# Patient Record
Sex: FEMALE | Race: WHITE | NOT HISPANIC OR LATINO | Employment: OTHER | URBAN - METROPOLITAN AREA
[De-identification: names, ages, dates, MRNs, and addresses within clinical notes are randomized per-mention and may not be internally consistent; named-entity substitution may affect disease eponyms.]

---

## 2018-06-28 ENCOUNTER — TELEPHONE (OUTPATIENT)
Dept: SURGERY | Facility: CLINIC | Age: 68
End: 2018-06-28

## 2018-06-28 NOTE — TELEPHONE ENCOUNTER
----- Message from Loren Valentin sent at 6/28/2018  2:21 PM CDT -----  Contact: Loren - [facility] Baldwin and staff,     Dr Jude Acevedo  Is referring this pt to Dr Johnson as he has seen her in the past.     Dx Pancreatic lesion/mass.     Can you see this pt on his behalf?     The refrl, records are in media     Thank you,  Loren Valentin  [facility]   [id number]

## 2018-07-09 ENCOUNTER — INITIAL CONSULT (OUTPATIENT)
Dept: SURGERY | Facility: CLINIC | Age: 68
End: 2018-07-09
Payer: MEDICARE

## 2018-07-09 VITALS
BODY MASS INDEX: 31.66 KG/M2 | HEIGHT: 64 IN | HEART RATE: 65 BPM | SYSTOLIC BLOOD PRESSURE: 142 MMHG | DIASTOLIC BLOOD PRESSURE: 86 MMHG | WEIGHT: 185.44 LBS | TEMPERATURE: 98 F

## 2018-07-09 DIAGNOSIS — K86.89 PANCREATIC MASS: ICD-10-CM

## 2018-07-09 PROCEDURE — 99213 OFFICE O/P EST LOW 20 MIN: CPT | Mod: PBBFAC | Performed by: NURSE PRACTITIONER

## 2018-07-09 PROCEDURE — 99999 PR PBB SHADOW E&M-EST. PATIENT-LVL III: CPT | Mod: PBBFAC,,, | Performed by: NURSE PRACTITIONER

## 2018-07-09 PROCEDURE — 99204 OFFICE O/P NEW MOD 45 MIN: CPT | Mod: S$PBB,,, | Performed by: NURSE PRACTITIONER

## 2018-07-09 RX ORDER — MONTELUKAST SODIUM 10 MG/1
10 TABLET ORAL NIGHTLY
Refills: 1 | COMMUNITY
Start: 2018-04-20 | End: 2018-08-08

## 2018-07-09 RX ORDER — ATORVASTATIN CALCIUM 40 MG/1
TABLET, FILM COATED ORAL
COMMUNITY

## 2018-07-09 RX ORDER — AMOXICILLIN 500 MG
CAPSULE ORAL DAILY
COMMUNITY

## 2018-07-09 RX ORDER — METOPROLOL SUCCINATE 50 MG/1
TABLET, EXTENDED RELEASE ORAL
COMMUNITY

## 2018-07-09 RX ORDER — ALBUTEROL SULFATE 90 UG/1
AEROSOL, METERED RESPIRATORY (INHALATION)
COMMUNITY
End: 2018-08-08

## 2018-07-09 NOTE — LETTER
July 10, 2018      Jude Acevedo MD  [address]  [place] [address]           [facility]  [address]  [place] [address]  Phone: [phone]          Patient: Starr Lau   MR Number: [id number]   YOB: 1950   Date of Visit: 7/9/2018       Dear Dr. Jude Acevedo:    Thank you for referring Starr Lau to [facility] for evaluation. Attached you will find relevant portions of my assessment and plan of care.    If you have questions, please do not hesitate to call me. I look forward to following Starr Lau along with you.    Sincerely,    Stacy Pillai, EDMUND    Enclosure      If you would like to receive this communication electronically, please contact [email or web address] or [phone] to request more information on [employer]* Link access.    For providers and/or their staff who would like to refer a patient to [facility], please contact us through our one-stop-shop provider referral line, [facility], at [phone].    If you feel you have received this communication in error or would no longer like to receive these types of communications, please e-mail [email or web address]

## 2018-07-09 NOTE — PROGRESS NOTES
Patient seen with Lorraine Pillai NP; current outside CT scan personally reviewed. The cystic mass has increased to 3 cm in size, and there has been significant dilation of the upstream PD. Dr Pascual's EUS note from 2012 found in [facility]; his impression at that time was that this was probably a serous cystadenoma. I have recommended repeat EUS to be sure we have accurately characterized the lesion. Discussed with Ms Lau and her sisters. We will also plan to present her to Pancreatic Cyst Conference after the EUS.

## 2018-07-10 ENCOUNTER — TELEPHONE (OUTPATIENT)
Dept: GASTROENTEROLOGY | Facility: CLINIC | Age: 68
End: 2018-07-10

## 2018-07-10 DIAGNOSIS — K86.3 CYST AND PSEUDOCYST OF PANCREAS: Primary | ICD-10-CM

## 2018-07-10 DIAGNOSIS — K86.2 CYST AND PSEUDOCYST OF PANCREAS: Primary | ICD-10-CM

## 2018-07-10 PROBLEM — K86.89 PANCREATIC MASS: Status: ACTIVE | Noted: 2018-07-10

## 2018-07-10 NOTE — PROGRESS NOTES
"History & Physical    SUBJECTIVE:     History of Present Illness:  Ms. Lau is a healthily and active 68 year old female previously seen and evaluated by Kayy Johnson and Ankur for a pancreatic head mass.  The testing done at the time, 8/2010, showed findings most consistent with a serous cystadenoma.     EUS from 8/2010:  2. 7 cm multicystic, honeycombed appearance..  PD was 6.5 mm without defect in HOP, 2.2 in body and 1.4mm in tail.  Mass well defined, no LAD, no vessel involvement noted.    Endosonographic appearance suspicious for SCA.     PATH:   Benign with at least focal PAS positivity, suggestive of microcystic adenoma.     She was followed with imaging thru 10/10/2013:  CT 2013: 1.  Stable to slightly enlarged pancreatic head mass which displayed cystic features according to report from prior endoscopic ultrasound.  There is no evidence of metastatic disease or lymphadenopathy.  2.  Stable left lower lobe pulmonary nodule.  3.  Multiple subcentimeter liver hypodensities, unchanged.    She has some updated imaging outside lately and the images and report were personally viewed by Dr. Baldwin.  I do not have access to the 2010, 2011 or 2012 images but I do have access to 2013.  I measure the mass roughly the same on both at ~ 3.2 x 3.0 cm but the PD has increased from 6mm to 10 mm dilatation in the HOP on the recent study.  The original size of the lesion on EUS in 2010 was reported at 2.7, and 2.4 x 2.0 on CT.       The original reason for the imaging was epigastric discomfort which was later attributed to cholecystitis and she did undergo a lap guido by Dr. Johnson on 2/2/11 with findings consistent with chronic cholecystitis.  Her symptoms improved and the cystic lesion, given no significant change over several years, was deemed consistent with a benign serous cystadenoma.  Her serum lipase and amylase at the time were normal.     Currently she has some mild "epigastric pressure" radiating to her mid back " which is not particularly bothersome to her, she is simple aware of it.  Reading past notes from 2010, however, that is pretty much how she described her symptoms at that time. Otherwise she is very active with an excellent FS.  She has no hx of pancreatitis.          Review of patient's allergies indicates:  No Known Allergies    Current Outpatient Prescriptions   Medication Sig Dispense Refill    albuterol (PROAIR HFA) 90 mcg/actuation inhaler ProAir HFA 90 mcg/actuation aerosol inhaler      aspirin (ECOTRIN) 81 MG EC tablet Take 81 mg by mouth once daily.      atorvastatin (LIPITOR) 40 MG tablet atorvastatin 40 mg tablet      calcium carbonate (OS-ABDIEL) 600 mg (1,500 mg) Tab Take 600 mg by mouth 2 (two) times daily with meals.      co-enzyme Q-10 50 mg capsule Take 100 mg by mouth once daily.      fish oil-omega-3 fatty acids 300-1,000 mg capsule Take by mouth once daily.      Lactobacillus rhamnosus GG (CULTURELLE) 10 billion cell capsule Take 1 capsule by mouth once daily.      lisinopril (PRINIVIL,ZESTRIL) 2.5 MG tablet Take 2.5 mg by mouth Daily.      metoprolol succinate (TOPROL-XL) 50 MG 24 hr tablet metoprolol succinate ER 50 mg tablet,extended release 24 hr      montelukast (SINGULAIR) 10 mg tablet Take 10 mg by mouth every evening.  1    multivitamin capsule Take 1 capsule by mouth once daily.       No current facility-administered medications for this visit.        Past Medical History:   Diagnosis Date    Asthma     Hyperlipidemia     Hypertension      Past Surgical History:   Procedure Laterality Date    HYSTERECTOMY      KNEE ARTHROSCOPY  2001     Family History   Problem Relation Age of Onset    Cancer Mother         Br    Cancer Sister         Br     Social History   Substance Use Topics    Smoking status: Never Smoker    Smokeless tobacco: Never Used    Alcohol use 1.2 oz/week     2 Cans of beer per week        Review of Systems:  Review of Systems   Constitutional: Negative.   "  HENT: Negative.    Eyes: Negative.    Respiratory: Negative.    Cardiovascular: Negative.    Gastrointestinal: Negative.         Mild pressure from RUQ to back   Genitourinary: Negative.    Musculoskeletal: Negative.    Skin: Negative.    Neurological: Negative.    Psychiatric/Behavioral: Negative.        OBJECTIVE:     Vital Signs (Most Recent)  Temp: 98 °F (36.7 °C) (07/09/18 1454)  Pulse: 65 (07/09/18 1454)  BP: (!) 142/86 (07/09/18 1454)  5' 4" (1.626 m)  84.1 kg (185 lb 6.5 oz)     Physical Exam:  Physical Exam   Constitutional: She is oriented to person, place, and time. She appears well-developed and well-nourished.   HENT:   Head: Normocephalic and atraumatic.   Eyes: Conjunctivae are normal.   Neck: Normal range of motion. Neck supple.   Cardiovascular: Normal rate, regular rhythm and normal heart sounds.    Pulmonary/Chest: Effort normal and breath sounds normal.   Abdominal: Soft. Bowel sounds are normal.   Musculoskeletal: Normal range of motion. She exhibits no edema.   Neurological: She is alert and oriented to person, place, and time.   Skin: Skin is dry.   Psychiatric: She has a normal mood and affect.         ASSESSMENT/PLAN:     Pancreatic head mass as above.      PLAN:  Given some reported growth and increased PD dilatation over time we will repeat an EUS for additional info and then discuss in pancreatic cyst conference.    Additioanl recommendations to follow.    "

## 2018-07-10 NOTE — TELEPHONE ENCOUNTER
----- Message from Esperanza Rodney MD sent at 7/10/2018 11:29 AM CDT -----  Agree, EUS and then discuss in conference.     ----- Message -----  From: Cecilia Biggs MA  Sent: 7/9/2018   4:07 PM  To: Esperanza Rodney MD    Please advise  ----- Message -----  From: Stacy Pillai NP  Sent: 7/9/2018   4:01 PM  To: Cecilia Biggs MA, Lou Lau, RN    Saji Young,  She needs an EUS for pancreatic cystic lesion in [facility].  Dr. Pascual did one in 2010 and report is in [facility].  It has grown to 3.3 cm from 2.4? and PD is now 11mm, was 6 mm.  Was thought to be a serous cystadenoma and last CT was 5 years ago.  Those images are in [facility] and I will have her updated outside images scanned in.    She is on vacation all next week.  Please put her on for cyst clinic after EUS.    Thanks.  Lorraine

## 2018-07-13 ENCOUNTER — TELEPHONE (OUTPATIENT)
Dept: GASTROENTEROLOGY | Facility: CLINIC | Age: 68
End: 2018-07-13

## 2018-07-13 ENCOUNTER — TELEPHONE (OUTPATIENT)
Dept: ENDOSCOPY | Facility: HOSPITAL | Age: 68
End: 2018-07-13

## 2018-07-13 NOTE — TELEPHONE ENCOUNTER
Spoke with patient. EUS scheduled for 7/26 at 9a. Reviewed prep instructions. Ms Lau verbalized understanding.

## 2018-07-13 NOTE — TELEPHONE ENCOUNTER
----- Message from Marlin Dubose sent at 7/13/2018  9:02 AM CDT -----  Contact: Self- [phone]  Devonte Rodney- pt states she is returning a missed call- please contact pt at [phone]

## 2018-07-26 ENCOUNTER — SURGERY (OUTPATIENT)
Age: 68
End: 2018-07-26

## 2018-07-26 ENCOUNTER — HOSPITAL ENCOUNTER (OUTPATIENT)
Facility: HOSPITAL | Age: 68
Discharge: HOME | End: 2018-07-26
Attending: INTERNAL MEDICINE | Admitting: INTERNAL MEDICINE
Payer: MEDICARE

## 2018-07-26 ENCOUNTER — ANESTHESIA (OUTPATIENT)
Dept: ENDOSCOPY | Facility: HOSPITAL | Age: 68
End: 2018-07-26
Payer: MEDICARE

## 2018-07-26 ENCOUNTER — ANESTHESIA EVENT (OUTPATIENT)
Dept: ENDOSCOPY | Facility: HOSPITAL | Age: 68
End: 2018-07-26
Payer: MEDICARE

## 2018-07-26 VITALS
RESPIRATION RATE: 18 BRPM | WEIGHT: 186 LBS | TEMPERATURE: 98 F | OXYGEN SATURATION: 95 % | HEIGHT: 64 IN | SYSTOLIC BLOOD PRESSURE: 131 MMHG | HEART RATE: 61 BPM | DIASTOLIC BLOOD PRESSURE: 79 MMHG | BODY MASS INDEX: 31.76 KG/M2

## 2018-07-26 DIAGNOSIS — K86.2 PANCREATIC CYST: ICD-10-CM

## 2018-07-26 DIAGNOSIS — K86.89 PANCREATIC MASS: Primary | ICD-10-CM

## 2018-07-26 PROCEDURE — 88305 TISSUE EXAM BY PATHOLOGIST: CPT | Mod: 26,,, | Performed by: PATHOLOGY

## 2018-07-26 PROCEDURE — 25000003 PHARM REV CODE 250: Performed by: INTERNAL MEDICINE

## 2018-07-26 PROCEDURE — 25000003 PHARM REV CODE 250: Performed by: ANESTHESIOLOGY

## 2018-07-26 PROCEDURE — 88305 TISSUE EXAM BY PATHOLOGIST: CPT | Performed by: PATHOLOGY

## 2018-07-26 PROCEDURE — D9220A PRA ANESTHESIA: Mod: ANES,,, | Performed by: ANESTHESIOLOGY

## 2018-07-26 PROCEDURE — D9220A PRA ANESTHESIA: Mod: CRNA,,, | Performed by: NURSE ANESTHETIST, CERTIFIED REGISTERED

## 2018-07-26 PROCEDURE — 27202059 HC NEEDLE, FNA (ANY): Performed by: INTERNAL MEDICINE

## 2018-07-26 PROCEDURE — 63600175 PHARM REV CODE 636 W HCPCS: Performed by: NURSE ANESTHETIST, CERTIFIED REGISTERED

## 2018-07-26 PROCEDURE — 88172 CYTP DX EVAL FNA 1ST EA SITE: CPT | Mod: 26,,, | Performed by: PATHOLOGY

## 2018-07-26 PROCEDURE — 43242 EGD US FINE NEEDLE BX/ASPIR: CPT | Mod: ,,, | Performed by: INTERNAL MEDICINE

## 2018-07-26 PROCEDURE — 37000009 HC ANESTHESIA EA ADD 15 MINS: Performed by: INTERNAL MEDICINE

## 2018-07-26 PROCEDURE — 37000008 HC ANESTHESIA 1ST 15 MINUTES: Performed by: INTERNAL MEDICINE

## 2018-07-26 PROCEDURE — 88173 CYTOPATH EVAL FNA REPORT: CPT | Mod: 26,,, | Performed by: PATHOLOGY

## 2018-07-26 PROCEDURE — 43242 EGD US FINE NEEDLE BX/ASPIR: CPT | Performed by: INTERNAL MEDICINE

## 2018-07-26 RX ORDER — SODIUM CITRATE AND CITRIC ACID MONOHYDRATE 334; 500 MG/5ML; MG/5ML
30 SOLUTION ORAL ONCE
Status: COMPLETED | OUTPATIENT
Start: 2018-07-26 | End: 2018-07-26

## 2018-07-26 RX ORDER — MIDAZOLAM HYDROCHLORIDE 1 MG/ML
INJECTION, SOLUTION INTRAMUSCULAR; INTRAVENOUS
Status: DISCONTINUED | OUTPATIENT
Start: 2018-07-26 | End: 2018-07-26

## 2018-07-26 RX ORDER — SODIUM CHLORIDE 0.9 % (FLUSH) 0.9 %
3 SYRINGE (ML) INJECTION
Status: DISCONTINUED | OUTPATIENT
Start: 2018-07-26 | End: 2018-07-26 | Stop reason: HOSPADM

## 2018-07-26 RX ORDER — PROPOFOL 10 MG/ML
VIAL (ML) INTRAVENOUS
Status: DISCONTINUED | OUTPATIENT
Start: 2018-07-26 | End: 2018-07-26

## 2018-07-26 RX ORDER — SODIUM CHLORIDE 9 MG/ML
INJECTION, SOLUTION INTRAVENOUS CONTINUOUS
Status: DISCONTINUED | OUTPATIENT
Start: 2018-07-26 | End: 2018-07-26 | Stop reason: HOSPADM

## 2018-07-26 RX ORDER — LIDOCAINE HCL/PF 100 MG/5ML
SYRINGE (ML) INTRAVENOUS
Status: DISCONTINUED | OUTPATIENT
Start: 2018-07-26 | End: 2018-07-26

## 2018-07-26 RX ORDER — PROPOFOL 10 MG/ML
VIAL (ML) INTRAVENOUS CONTINUOUS PRN
Status: DISCONTINUED | OUTPATIENT
Start: 2018-07-26 | End: 2018-07-26

## 2018-07-26 RX ORDER — CIPROFLOXACIN 2 MG/ML
INJECTION, SOLUTION INTRAVENOUS
Status: DISCONTINUED | OUTPATIENT
Start: 2018-07-26 | End: 2018-07-26

## 2018-07-26 RX ADMIN — CIPROFLOXACIN 400 MG: 2 INJECTION, SOLUTION INTRAVENOUS at 09:07

## 2018-07-26 RX ADMIN — PROPOFOL 50 MG: 10 INJECTION, EMULSION INTRAVENOUS at 09:07

## 2018-07-26 RX ADMIN — MIDAZOLAM HYDROCHLORIDE 1 MG: 1 INJECTION, SOLUTION INTRAMUSCULAR; INTRAVENOUS at 09:07

## 2018-07-26 RX ADMIN — SODIUM CITRATE AND CITRIC ACID MONOHYDRATE 30 ML: 500; 334 SOLUTION ORAL at 10:07

## 2018-07-26 RX ADMIN — SODIUM CHLORIDE: 0.9 INJECTION, SOLUTION INTRAVENOUS at 08:07

## 2018-07-26 RX ADMIN — LIDOCAINE HYDROCHLORIDE 100 MG: 20 INJECTION, SOLUTION INTRAVENOUS at 09:07

## 2018-07-26 RX ADMIN — PROPOFOL 75 MCG/KG/MIN: 10 INJECTION, EMULSION INTRAVENOUS at 09:07

## 2018-07-26 NOTE — ANESTHESIA PREPROCEDURE EVALUATION
07/26/2018  Starr Lau is a 68 y.o., female.  Past Medical History:   Diagnosis Date    Asthma     Hyperlipidemia     Hypertension     Pancreatic cyst      Past Surgical History:   Procedure Laterality Date    EYE SURGERY      HYSTERECTOMY      KNEE ARTHROSCOPY  2001         Anesthesia Evaluation    I have reviewed the Patient Summary Reports.    I have reviewed the Nursing Notes.   I have reviewed the Medications.     Review of Systems  Anesthesia Hx:  No problems with previous Anesthesia  History of prior surgery of interest to airway management or planning: Previous anesthesia: General Denies Family Hx of Anesthesia complications.   Denies Personal Hx of Anesthesia complications.   Social:  Non-Smoker    Cardiovascular:   Hypertension Denies CAD.       Pulmonary:   Denies Asthma. Pt does not have asthma   Hepatic/GI:   Denies GERD. Denies Liver Disease. Pancreatic mass   Neurological:  Neurology Normal    Endocrine:  Endocrine Normal        Physical Exam  General:  Well nourished    Airway/Jaw/Neck:  Airway Findings: Mouth Opening: Normal Tongue: Normal  General Airway Assessment: Adult  Mallampati: II  Improves to I with phonation.  TM Distance: Normal, at least 6 cm  Jaw/Neck Findings:  Neck ROM: Normal ROM      Dental:  Dental Findings: In tact   Chest/Lungs:  Chest/Lungs Findings: Normal Respiratory Rate     Heart/Vascular:  Heart Findings: Rate: Normal  Rhythm: Regular Rhythm        Mental Status:  Mental Status Findings:  Alert and Oriented, Cooperative         Anesthesia Plan  Type of Anesthesia, risks & benefits discussed:  Anesthesia Type:  general  Patient's Preference:   Intra-op Monitoring Plan:   Intra-op Monitoring Plan Comments:   Post Op Pain Control Plan:   Post Op Pain Control Plan Comments:   Induction:   IV  Beta Blocker:  Patient is not currently on a Beta-Blocker (No  further documentation required).       Informed Consent: Patient understands risks and agrees with Anesthesia plan.  Questions answered. Anesthesia consent signed with patient.  ASA Score: 2     Day of Surgery Review of History & Physical:    H&P update referred to the surgeon.         Ready For Surgery From Anesthesia Perspective.

## 2018-07-26 NOTE — H&P
Short Stay Endoscopy History and Physical    PCP - Jude Acevedo MD  Referring Physician - Stacy Pillai, NP  [address] [place] [address]    Procedure - EUS  ASA - per anesthesia  Mallampati - per anesthesia  History of Anesthesia problems - no  Family history Anesthesia problems -  no   Plan of anesthesia - General    HPI:  This is a 68 y.o. female here for evaluation of: enlarging pancreatic cyst    Reflux - no  Dysphagia - no  Abdominal pain - no  Diarrhea - no    ROS:  Constitutional: No fevers, chills, No weight loss  CV: No chest pain  Pulm: No cough, No shortness of breath  Ophtho: No vision changes  GI: see HPI  Derm: No rash    Medical History:  has a past medical history of Asthma; Hyperlipidemia; Hypertension; and Pancreatic cyst.    Surgical History:  has a past surgical history that includes Knee arthroscopy (2001); Hysterectomy; and Eye surgery.    Family History: family history includes Cancer in her maternal aunt..    Social History:  reports that she has never smoked. She has never used smokeless tobacco. She reports that she drinks about 1.2 oz of alcohol per week . She reports that she does not use drugs.    Review of patient's allergies indicates:  No Known Allergies    Medications:   Prescriptions Prior to Admission   Medication Sig Dispense Refill Last Dose    aspirin (ECOTRIN) 81 MG EC tablet Take 81 mg by mouth once daily.   Past Week at Unknown time    atorvastatin (LIPITOR) 40 MG tablet atorvastatin 40 mg tablet   7/25/2018 at Unknown time    calcium carbonate (OS-ABDIEL) 600 mg (1,500 mg) Tab Take 600 mg by mouth 2 (two) times daily with meals.   7/25/2018 at Unknown time    co-enzyme Q-10 50 mg capsule Take 100 mg by mouth once daily.   7/25/2018 at Unknown time    fish oil-omega-3 fatty acids 300-1,000 mg capsule Take by mouth once daily.   7/25/2018 at Unknown time    Lactobacillus rhamnosus GG (CULTURELLE) 10 billion cell capsule Take 1 capsule by mouth  once daily.   Past Week at Unknown time    lisinopril (PRINIVIL,ZESTRIL) 2.5 MG tablet Take 2.5 mg by mouth Daily.   7/26/2018 at Unknown time    metoprolol succinate (TOPROL-XL) 50 MG 24 hr tablet metoprolol succinate ER 50 mg tablet,extended release 24 hr   7/25/2018 at Unknown time    multivitamin capsule Take 1 capsule by mouth once daily.   7/25/2018 at Unknown time    albuterol (PROAIR HFA) 90 mcg/actuation inhaler ProAir HFA 90 mcg/actuation aerosol inhaler   More than a month at Unknown time    montelukast (SINGULAIR) 10 mg tablet Take 10 mg by mouth every evening.  1 More than a month at Unknown time       Physical Exam:    Vital Signs:   Vitals:    07/26/18 0801   BP: (!) 143/80   Pulse: 62   Resp: 16   Temp: 98.8 °F (37.1 °C)       General Appearance: Well appearing in no acute distress  Eyes:   slight scleral icterus  ENT: Neck supple  Lungs: CTA anteriorly  Heart:  Regular rate  Abdomen: Soft, non tender, non distended with normal bowel sounds.  Extremities: No edema  Skin: No rash    Labs:  Lab Results   Component Value Date    WBC 4.22 10/10/2013    HGB 14.3 10/10/2013    HCT 42.1 10/10/2013     10/10/2013    ALT 17 10/10/2013    AST 18 10/10/2013     10/10/2013    K 4.1 10/10/2013     10/10/2013    CREATININE 0.8 10/10/2013    BUN 18 10/10/2013    CO2 33 (H) 10/10/2013    TSH 1.2 10/18/2004       I have explained the risks and benefits of this endoscopic procedure to the patient including but not limited to bleeding, inflammation, infection, perforation, and death.      Quinn Santiago MD

## 2018-07-26 NOTE — TRANSFER OF CARE
"Anesthesia Transfer of Care Note    Patient: Starr Lau    Procedure(s) Performed: Procedure(s) (LRB):  ULTRASOUND, ENDOSCOPIC, UPPER GI TRACT (N/A)    Patient location: PACU    Anesthesia Type: general    Transport from OR: Transported from OR on 2-3 L/min O2 by NC with adequate spontaneous ventilation    Post pain: adequate analgesia    Post assessment: no apparent anesthetic complications and tolerated procedure well    Post vital signs: stable    Level of consciousness: alert, oriented and awake    Nausea/Vomiting: no nausea/vomiting    Complications: none    Transfer of care protocol was followed      Last vitals:   Visit Vitals  BP (!) 143/80 (BP Location: Left arm, Patient Position: Lying)   Pulse 62   Temp 37.1 °C (98.8 °F) (Temporal)   Resp 16   Ht 5' 4" (1.626 m)   Wt 84.4 kg (186 lb)   SpO2 98%   Breastfeeding? No   BMI 31.93 kg/m²     "

## 2018-07-26 NOTE — PLAN OF CARE
Pt exhibiting no s/s of pain or nausea. Pt ready for discharge, AAOx4. Pt tolerating PO fluid intake. Discharge instructions given, reviewed with patient and family. Parties verbalized understanding of follow-up care and home care.

## 2018-07-26 NOTE — ANESTHESIA RELEASE NOTE
"Anesthesia Release from PACU Note    Patient: Starr Lau    Procedure(s) Performed: Procedure(s) (LRB):  ULTRASOUND, ENDOSCOPIC, UPPER GI TRACT (N/A)    Anesthesia type: general    Post pain: Adequate analgesia    Post assessment: no apparent anesthetic complications and tolerated procedure well    Last Vitals:   Visit Vitals  /79 (BP Location: Left arm, Patient Position: Sitting)   Pulse 61   Temp 36.7 °C (98.1 °F) (Temporal)   Resp 18   Ht 5' 4" (1.626 m)   Wt 84.4 kg (186 lb)   SpO2 95%   Breastfeeding? No   BMI 31.93 kg/m²       Post vital signs: stable    Level of consciousness: awake and alert     Nausea/Vomiting: no nausea/no vomiting    Complications: none    Airway Patency: patent    Respiratory: unassisted, spontaneous ventilation, room air    Cardiovascular: stable and blood pressure at baseline    Hydration: euvolemic  "

## 2018-07-26 NOTE — ANESTHESIA POSTPROCEDURE EVALUATION
"Anesthesia Post Evaluation    Patient: Starr Lau    Procedure(s) Performed: Procedure(s) (LRB):  ULTRASOUND, ENDOSCOPIC, UPPER GI TRACT (N/A)    Final Anesthesia Type: general  Patient location during evaluation: [facility]  Patient participation: Yes- Able to Participate  Level of consciousness: awake and alert  Post-procedure vital signs: reviewed and stable  Pain management: adequate  Airway patency: patent  PONV status at discharge: No PONV  Anesthetic complications: no      Cardiovascular status: hemodynamically stable  Respiratory status: unassisted, spontaneous ventilation and room air  Hydration status: euvolemic  Follow-up not needed.        Visit Vitals  /79 (BP Location: Left arm, Patient Position: Sitting)   Pulse 61   Temp 36.7 °C (98.1 °F) (Temporal)   Resp 18   Ht 5' 4" (1.626 m)   Wt 84.4 kg (186 lb)   SpO2 95%   Breastfeeding? No   BMI 31.93 kg/m²       Pain/Michael Score: Pain Assessment Performed: Yes (7/26/2018 10:45 AM)  Presence of Pain: complains of pain/discomfort (7/26/2018 10:45 AM)  Pain Rating Prior to Med Admin: 3 (7/26/2018 10:45 AM)  Michael Score: 10 (7/26/2018 10:15 AM)      "

## 2018-07-26 NOTE — DISCHARGE INSTRUCTIONS
Recovery After Procedural Sedation (Adult)  You have been given medicine by vein to make you sleep during your surgery. This may have included both a pain medicine and sleeping medicine. Most of the effects have worn off. But you may still have some drowsiness for the next 6 to 8 hours.  Home care  Follow these guidelines when you get home:  · For the next 8 hours, you should be watched by a responsible adult. This person should make sure your condition is not getting worse.  · Don't drink any alcohol for the next 24 hours.  · Don't drive, operate dangerous machinery, or make important business or personal decisions during the next 24 hours.  Note: Your healthcare provider may tell you not to take any medicine by mouth for pain or sleep in the next 4 hours. These medicines may react with the medicines you were given in the hospital. This could cause a much stronger response than usual.  Follow-up care  Follow up with your healthcare provider if you are not alert and back to your usual level of activity within 12 hours.  When to seek medical advice  Call your healthcare provider right away if any of these occur:  · Drowsiness gets worse  · Weakness or dizziness gets worse  · Repeated vomiting  · You can't be awakened   Date Last Reviewed: 10/18/2016  © 2205-4774 [employer]. [address] [place] [address], [place] [address]. All rights reserved. This information is not intended as a substitute for professional medical care. Always follow your healthcare professional's instructions.

## 2018-07-26 NOTE — [employer] PATIENT INSTRUCTIONS
Discharge Summary/Instructions after an Endoscopic Procedure  Patient Name: Starr Lau  Patient MRN: [id number]  Patient YOB: 1950 Thursday, July 26, 2018  Quinn Santiago MD  RESTRICTIONS:  During your procedure today, you received medications for sedation.  These   medications may affect your judgment, balance and coordination.  Therefore,   for 24 hours, you have the following restrictions:   - DO NOT drive a car, operate machinery, make legal/financial decisions,   sign important papers or drink alcohol.    ACTIVITY:  Today: no heavy lifting, straining or running due to procedural   sedation/anesthesia.  The following day: return to full activity including work.  DIET:  Eat and drink normally unless instructed otherwise.     TREATMENT FOR COMMON SIDE EFFECTS:  - Mild abdominal pain, nausea, belching, bloating or excessive gas:  rest,   eat lightly and use a heating pad.  - Sore Throat: treat with throat lozenges and/or gargle with warm salt   water.  - Because air was used during the procedure, expelling large amounts of air   from your rectum or belching is normal.  - If a bowel prep was taken, you may not have a bowel movement for 1-3 days.    This is normal.  SYMPTOMS TO WATCH FOR AND REPORT TO YOUR PHYSICIAN:  1. Abdominal pain or bloating, other than gas cramps.  2. Chest pain.  3. Back pain.  4. Signs of infection such as: chills or fever occurring within 24 hours   after the procedure.  5. Rectal bleeding, which would show as bright red, maroon, or black stools.   (A tablespoon of blood from the rectum is not serious, especially if   hemorrhoids are present.)  6. Vomiting.  7. Weakness or dizziness.  GO DIRECTLY TO THE NEAREST EMERGENCY ROOM IF YOU HAVE ANY OF THE FOLLOWING:      Difficulty breathing              Chills and/or fever over 101 F   Persistent vomiting and/or vomiting blood   Severe abdominal pain   Severe chest pain   Black, tarry stools   Bleeding- more than one tablespoon   Any  other symptom or condition that you feel may need urgent attention  Your doctor recommends these additional instructions:  If any biopsies were taken, your doctors clinic will contact you in 1 to 2   weeks with any results.  - Discharge patient to home (ambulatory).   - Resume previous diet; Discharge to home (ambulatory); Resume outpatient   medications  - Return to referring physician as previously scheduled.   - Discuss at multidisciplinary pancreatic cyst conference.  For questions, problems or results please call your physician - Quinn Santiago MD at Work:  [phone].  [facility], EMERGENCY ROOM PHONE NUMBER: [phone]  IF A COMPLICATION OR EMERGENCY SITUATION ARISES AND YOU ARE UNABLE TO REACH   YOUR PHYSICIAN - GO DIRECTLY TO THE EMERGENCY ROOM.  Quinn Santiago MD  7/26/2018 10:39:34 AM  This report has been verified and signed electronically.  [employer]

## 2018-07-30 ENCOUNTER — TELEPHONE (OUTPATIENT)
Dept: GASTROENTEROLOGY | Facility: CLINIC | Age: 68
End: 2018-07-30

## 2018-07-30 NOTE — TELEPHONE ENCOUNTER
----- Message from Quinn Santiago MD sent at 7/30/2018 11:32 AM CDT -----  Hannah- Please let pt know that her biopsy did not show anything concerning. However, it does appear that this cyst is blocking her pancreas. As discussed this morning at panc cyst conference, she will need to f/u with Dr. Baldwin regarding to discuss possible surgery. Thanks.

## 2018-07-31 ENCOUNTER — TELEPHONE (OUTPATIENT)
Dept: SURGERY | Facility: CLINIC | Age: 68
End: 2018-07-31

## 2018-08-08 ENCOUNTER — OFFICE VISIT (OUTPATIENT)
Dept: SURGERY | Facility: CLINIC | Age: 68
End: 2018-08-08
Payer: MEDICARE

## 2018-08-08 VITALS
BODY MASS INDEX: 33.09 KG/M2 | WEIGHT: 193.81 LBS | DIASTOLIC BLOOD PRESSURE: 92 MMHG | TEMPERATURE: 97 F | HEART RATE: 57 BPM | HEIGHT: 64 IN | SYSTOLIC BLOOD PRESSURE: 158 MMHG

## 2018-08-08 DIAGNOSIS — D13.6 CYSTADENOMA OF PANCREAS: ICD-10-CM

## 2018-08-08 PROCEDURE — 99213 OFFICE O/P EST LOW 20 MIN: CPT | Mod: PBBFAC | Performed by: SURGERY

## 2018-08-08 PROCEDURE — 99999 PR PBB SHADOW E&M-EST. PATIENT-LVL III: CPT | Mod: PBBFAC,,, | Performed by: SURGERY

## 2018-08-08 PROCEDURE — 99214 OFFICE O/P EST MOD 30 MIN: CPT | Mod: S$PBB,,, | Performed by: SURGERY

## 2018-08-08 NOTE — PROGRESS NOTES
EUS results noted; CT personally reviewed again.  Long talk with Mrs Lau and her son Ronak:    Repeat EUS again c/w serous cystadenoma. This had shown very little growth over a long (8 year) time frame. She is not having any significant symptoms (she does have mild epigastric 'heaviness' but she does not characterize this as pain and it is not affecting her appetite or diet), either on a daily or an episodic basis, and enjoys a normal quality of life and activity level. Evidently, the lesion has caused some degree of PD dilation and it remains to be seen if she will develop symptoms related to this.   I have recommended f/u in two years with MRI/MRCP. Of course, if new symptoms arise, she should check back before then. She understands and agrees.     > 30 minutes spent, all of it in counseling and treatment planning.

## 2020-07-13 ENCOUNTER — TELEPHONE (OUTPATIENT)
Dept: SURGERY | Facility: CLINIC | Age: 70
End: 2020-07-13

## 2020-07-13 DIAGNOSIS — K86.2 PANCREATIC CYST: ICD-10-CM

## 2020-07-13 DIAGNOSIS — R93.3 ABNORMAL FINDINGS ON DIAGNOSTIC IMAGING OF OTHER PARTS OF DIGESTIVE TRACT: ICD-10-CM

## 2020-07-13 NOTE — TELEPHONE ENCOUNTER
----- Message from Sukhjinder Durham sent at 7/13/2020  1:40 PM CDT -----  Regarding: f/u appt        The Pt would like a call back to schedule a f/u appt with Dr. Baldwin please.    Phone # [phone]

## 2020-08-07 ENCOUNTER — TELEPHONE (OUTPATIENT)
Dept: SURGERY | Facility: CLINIC | Age: 70
End: 2020-08-07

## 2020-08-07 NOTE — TELEPHONE ENCOUNTER
----- Message from Colleen Mas sent at 8/7/2020 12:38 PM CDT -----  Regarding: PT  Contact: PT  PT just found out she was around someone who has covid and wanted to know if she needed to reschedule her MRI and appointment with the doctor on Monday. Said she spoke to someone over here who told her she should be fine since she's not showing any symptoms but she wanted to check with the doctor. Please call back     Callback: [phone]

## 2020-08-31 ENCOUNTER — TELEPHONE (OUTPATIENT)
Dept: SURGERY | Facility: CLINIC | Age: 70
End: 2020-08-31

## 2020-09-14 ENCOUNTER — HOSPITAL ENCOUNTER (OUTPATIENT)
Dept: RADIOLOGY | Facility: HOSPITAL | Age: 70
Discharge: HOME | End: 2020-09-14
Attending: SURGERY
Payer: MEDICARE

## 2020-09-14 ENCOUNTER — OFFICE VISIT (OUTPATIENT)
Dept: SURGERY | Facility: CLINIC | Age: 70
End: 2020-09-14
Payer: MEDICARE

## 2020-09-14 VITALS
HEART RATE: 97 BPM | TEMPERATURE: 99 F | RESPIRATION RATE: 20 BRPM | SYSTOLIC BLOOD PRESSURE: 121 MMHG | WEIGHT: 200.19 LBS | BODY MASS INDEX: 34.36 KG/M2 | DIASTOLIC BLOOD PRESSURE: 80 MMHG

## 2020-09-14 DIAGNOSIS — R93.3 ABNORMAL FINDINGS ON DIAGNOSTIC IMAGING OF OTHER PARTS OF DIGESTIVE TRACT: ICD-10-CM

## 2020-09-14 DIAGNOSIS — D13.6 CYSTADENOMA OF PANCREAS: Primary | ICD-10-CM

## 2020-09-14 DIAGNOSIS — K86.2 PANCREATIC CYST: ICD-10-CM

## 2020-09-14 LAB
CREAT SERPL-MCNC: 0.7 MG/DL (ref 0.5–1.4)
SAMPLE: NORMAL

## 2020-09-14 PROCEDURE — 76376 MRI ABDOMEN WITH AND WO_INC MRCP: ICD-10-PCS | Mod: 26,,, | Performed by: RADIOLOGY

## 2020-09-14 PROCEDURE — 99213 PR OFFICE/OUTPT VISIT, EST, LEVL III, 20-29 MIN: ICD-10-PCS | Mod: S$PBB,,, | Performed by: SURGERY

## 2020-09-14 PROCEDURE — 99999 PR PBB SHADOW E&M-EST. PATIENT-LVL III: ICD-10-PCS | Mod: PBBFAC,,, | Performed by: SURGERY

## 2020-09-14 PROCEDURE — 74183 MRI ABDOMEN WITH AND WO_INC MRCP: ICD-10-PCS | Mod: 26,,, | Performed by: RADIOLOGY

## 2020-09-14 PROCEDURE — 99999 PR PBB SHADOW E&M-EST. PATIENT-LVL III: CPT | Mod: PBBFAC,,, | Performed by: SURGERY

## 2020-09-14 PROCEDURE — A9585 GADOBUTROL INJECTION: HCPCS | Performed by: SURGERY

## 2020-09-14 PROCEDURE — 99213 OFFICE O/P EST LOW 20 MIN: CPT | Mod: PBBFAC,25 | Performed by: SURGERY

## 2020-09-14 PROCEDURE — 25500020 PHARM REV CODE 255: Performed by: SURGERY

## 2020-09-14 PROCEDURE — 99213 OFFICE O/P EST LOW 20 MIN: CPT | Mod: S$PBB,,, | Performed by: SURGERY

## 2020-09-14 PROCEDURE — 76376 3D RENDER W/INTRP POSTPROCES: CPT | Mod: 26,,, | Performed by: RADIOLOGY

## 2020-09-14 PROCEDURE — 74183 MRI ABD W/O CNTR FLWD CNTR: CPT | Mod: 26,,, | Performed by: RADIOLOGY

## 2020-09-14 PROCEDURE — 76376 3D RENDER W/INTRP POSTPROCES: CPT | Mod: TC

## 2020-09-14 RX ORDER — MELOXICAM 15 MG/1
15 TABLET ORAL DAILY
COMMUNITY

## 2020-09-14 RX ORDER — GADOBUTROL 604.72 MG/ML
10 INJECTION INTRAVENOUS
Status: COMPLETED | OUTPATIENT
Start: 2020-09-14 | End: 2020-09-14

## 2020-09-14 RX ADMIN — GADOBUTROL 10 ML: 604.72 INJECTION INTRAVENOUS at 12:09

## 2020-09-14 NOTE — LETTER
September 14, 2020        Jude Acevedo MD  [address]  [place] [address]             [facility]  [address]  [facility] [address]  Phone: [phone]   Patient: Starr Lau   MR Number: [id number]   YOB: 1950   Date of Visit: 9/14/2020       Dear Dr. Acevedo:    Thank you for referring Starr Lau to me for evaluation. Attached you will find relevant portions of my assessment and plan of care.    If you have questions, please do not hesitate to call me. I look forward to following Starr Lau along with you.    Sincerely,      Johann Baldwin MD            CC  No Recipients    Enclosure

## 2020-09-14 NOTE — PROGRESS NOTES
F/U for serous cystadenoma of the head/uncinate  Recently (late July) she developed Covid as well as UTI and was hospitalized. She has largely recovered, but still has some weakness, muscle pains and neuralgia. She has had no GI or digestive problems. Appetite good, weight actually up some, and BMs are regular.     I personally reviewed her MRI/MRCP done today. The serous cystadenoma appears a little larger, measuring 3.7 cm on my review, and the PD is quite dilated (this is not new). I do not have the radiologist's report as yet.     Based on the lack of symptoms, and size < 5 cm, I do NOT recommend resection.   Discussed with Ms Lau and her daughter. She understands and agrees.     RTC 2 years with MRI/MRCP    Cc Dr Acevedo

## 2022-08-01 ENCOUNTER — TELEPHONE (OUTPATIENT)
Dept: SURGERY | Facility: CLINIC | Age: 72
End: 2022-08-01
Payer: MEDICARE

## 2022-08-01 DIAGNOSIS — D13.6 CYSTADENOMA OF PANCREAS: Primary | ICD-10-CM

## 2022-08-01 DIAGNOSIS — R93.3 ABNORMAL FINDINGS ON DIAGNOSTIC IMAGING OF OTHER PARTS OF DIGESTIVE TRACT: ICD-10-CM

## 2022-08-01 NOTE — TELEPHONE ENCOUNTER
Placed call to pt and informed pt her appt for her MRI/MRCP is [facility] for 9/14/22 @ 8:15 am @ [address]. Informed pt she has to be at the facility 30 mins before scan and NPO 4 hrs prior to it.  Informed pt her appt with BAM GUTIERREZ is [facility] for 10.30 am  Pt acknowledged her appts and times.

## 2022-09-14 ENCOUNTER — HOSPITAL ENCOUNTER (OUTPATIENT)
Dept: RADIOLOGY | Facility: HOSPITAL | Age: 72
Discharge: HOME | End: 2022-09-14
Attending: SURGERY
Payer: MEDICARE

## 2022-09-14 ENCOUNTER — OFFICE VISIT (OUTPATIENT)
Dept: SURGERY | Facility: CLINIC | Age: 72
End: 2022-09-14
Payer: MEDICARE

## 2022-09-14 VITALS
HEIGHT: 65 IN | OXYGEN SATURATION: 94 % | SYSTOLIC BLOOD PRESSURE: 136 MMHG | DIASTOLIC BLOOD PRESSURE: 72 MMHG | HEART RATE: 75 BPM | BODY MASS INDEX: 35.82 KG/M2 | WEIGHT: 215 LBS

## 2022-09-14 DIAGNOSIS — R93.3 ABNORMAL FINDINGS ON DIAGNOSTIC IMAGING OF OTHER PARTS OF DIGESTIVE TRACT: ICD-10-CM

## 2022-09-14 DIAGNOSIS — D13.6 CYSTADENOMA OF PANCREAS: ICD-10-CM

## 2022-09-14 DIAGNOSIS — D13.6 CYSTADENOMA OF PANCREAS: Primary | ICD-10-CM

## 2022-09-14 PROCEDURE — 99213 PR OFFICE/OUTPT VISIT, EST, LEVL III, 20-29 MIN: ICD-10-PCS | Mod: S$PBB,,, | Performed by: NURSE PRACTITIONER

## 2022-09-14 PROCEDURE — 74183 MRI ABDOMEN WITH AND WO_INC MRCP: ICD-10-PCS | Mod: 26,GC,, | Performed by: RADIOLOGY

## 2022-09-14 PROCEDURE — A9585 GADOBUTROL INJECTION: HCPCS | Performed by: SURGERY

## 2022-09-14 PROCEDURE — 99999 PR PBB SHADOW E&M-EST. PATIENT-LVL III: CPT | Mod: PBBFAC,,, | Performed by: NURSE PRACTITIONER

## 2022-09-14 PROCEDURE — 99213 OFFICE O/P EST LOW 20 MIN: CPT | Mod: S$PBB,,, | Performed by: NURSE PRACTITIONER

## 2022-09-14 PROCEDURE — 76376 3D RENDER W/INTRP POSTPROCES: CPT | Mod: 26,GC,, | Performed by: RADIOLOGY

## 2022-09-14 PROCEDURE — 99999 PR PBB SHADOW E&M-EST. PATIENT-LVL III: ICD-10-PCS | Mod: PBBFAC,,, | Performed by: NURSE PRACTITIONER

## 2022-09-14 PROCEDURE — 76376 MRI ABDOMEN WITH AND WO_INC MRCP: ICD-10-PCS | Mod: 26,GC,, | Performed by: RADIOLOGY

## 2022-09-14 PROCEDURE — 74183 MRI ABD W/O CNTR FLWD CNTR: CPT | Mod: TC

## 2022-09-14 PROCEDURE — 25500020 PHARM REV CODE 255: Performed by: SURGERY

## 2022-09-14 PROCEDURE — 99213 OFFICE O/P EST LOW 20 MIN: CPT | Mod: PBBFAC,25 | Performed by: NURSE PRACTITIONER

## 2022-09-14 PROCEDURE — 74183 MRI ABD W/O CNTR FLWD CNTR: CPT | Mod: 26,GC,, | Performed by: RADIOLOGY

## 2022-09-14 RX ORDER — GADOBUTROL 604.72 MG/ML
10 INJECTION INTRAVENOUS
Status: COMPLETED | OUTPATIENT
Start: 2022-09-14 | End: 2022-09-14

## 2022-09-14 RX ADMIN — GADOBUTROL 10 ML: 604.72 INJECTION INTRAVENOUS at 08:09

## 2022-09-14 NOTE — PROGRESS NOTES
"Encounter Date:  2022    Patient ID: Starr Lau  Age:  72 y.o. :  1950    Chief Complaint:  followup of pancreatic cyst    2010: EUS = 2.7 cm multicystic, honeycombed appearance. PD was 6.5 mm without defect in HOP, 2.2 in body and 1.4mm in tail.  Mass well defined, no LAD, no vessel involvement noted. Endosonographic appearance suspicious for SCA.   10/2013 (Dr. Johnson) Cyst looks stable at ~ 3.2 x 3.0 cm but the PD has increased from 6mm to 10 mm dilatation in the HOP  2018: (SHANTEL) Repeat EUS again c/w serous cystadenoma 3 cm. This had shown very little growth over a long (8 year) time frame.  2020: (SHANTEL) serous cystadenoma measures 3.7 cm, and the PD is quite dilated Based on the lack of symptoms, and size < 5 cm, I do NOT recommend resection.     Interval History:  Ms. Lau returns to clinic for surveillance with her . Pancreatic cyst was first identified by accident on abd U/S in 2010. She underwent EUS in 2018. She was last seen in clinic by Dr. Johann Baldwin in 2020. She had updated imaging this morning.   Overall she is feeling well, no specific complaints. Reports a good appetite, tolerating oral diet without N/V. Occ has abd cramping and diarrhea. She has gained 15# over the past 2 years. Denies CP, SOB. States she had "a new finding in my right lung, had CT scan at [facility] and saw a pulmonologist". She believes she is scheduled for repeat CT in 4 months.     No prior abd sx  Takes ASA   Never smoked    New Data:  Imaging:  Dr. Johann Baldwin and I personally reviewed the following images:   2022: MRI / MRCP:   Stable circumscribed micro cystic lesion at the pancreatic head with internal enhancing septae on delayed imaging, measuring 3.9 x 3.8 cm (previous 4.0 x 3.5 cm).  There is persistent marked dilatation of the main pancreatic duct measuring 1.1 cm (previously 1.1 cm) with atrophy of the body and pancreatic tail, unchanged.     Gallbladder surgically " absent.  Stable dilatation of the common bile duct proximal to the aforementioned pancreatic lesion measuring up to 1.0 cm with focal narrowing at the pancreatic head lesion.  No intrahepatic biliary dilatation.     Impression:     Probable pancreatic head serous cystadenoma, as above, not significantly changed from the 09/14/2020 exam.     Hepatic steatosis.      Past Medical History:   Diagnosis Date    Asthma     Hyperlipidemia     Hypertension     Pancreatic cyst     Pancreatic duct dilated      Past Surgical History:   Procedure Laterality Date    ENDOSCOPIC ULTRASOUND OF UPPER GASTROINTESTINAL TRACT N/A 7/26/2018    Procedure: ULTRASOUND, ENDOSCOPIC, UPPER GI TRACT;  Surgeon: Quinn Santiago MD;  Location: [facility] ([address] [place] [address]);  Service: Endoscopy;  Laterality: N/A;    EYE SURGERY      HYSTERECTOMY      KNEE ARTHROSCOPY  2001     Current Outpatient Medications on File Prior to Visit   Medication Sig Dispense Refill    aspirin (ECOTRIN) 81 MG EC tablet Take 81 mg by mouth once daily.      atorvastatin (LIPITOR) 40 MG tablet atorvastatin 40 mg tablet      calcium carbonate (OS-ABDIEL) 600 mg (1,500 mg) Tab Take 600 mg by mouth 2 (two) times daily with meals.      co-enzyme Q-10 50 mg capsule Take 100 mg by mouth once daily.      fish oil-omega-3 fatty acids 300-1,000 mg capsule Take by mouth once daily.      Lactobacillus rhamnosus GG (CULTURELLE) 10 billion cell capsule Take 1 capsule by mouth once daily.      lisinopril (PRINIVIL,ZESTRIL) 2.5 MG tablet Take 2.5 mg by mouth Daily.      meloxicam (MOBIC) 15 MG tablet Take 15 mg by mouth once daily.      metoprolol succinate (TOPROL-XL) 50 MG 24 hr tablet metoprolol succinate ER 50 mg tablet,extended release 24 hr      multivitamin capsule Take 1 capsule by mouth once daily.       No current facility-administered medications on file prior to visit.     Review of patient's allergies indicates:  No Known Allergies    Family History:  Her family history includes Cancer  "in her maternal aunt.     Social History:   reports that she has never smoked. She has never used smokeless tobacco. She reports current alcohol use of about 2.0 standard drinks per week. She reports that she does not use drugs.     ROS:     Review of Systems   Constitutional:  Negative for activity change, appetite change, fatigue and fever.   HENT:  Negative for trouble swallowing.    Respiratory:  Negative for cough and shortness of breath.    Cardiovascular:  Negative for chest pain and leg swelling.   Gastrointestinal:  Negative for abdominal pain, nausea and vomiting.   Genitourinary:  Negative for difficulty urinating.   Neurological:  Negative for weakness and light-headedness.   Hematological:  Does not bruise/bleed easily.   Pertinent positive/negatives detailed in HPI, all other systems negative.     Physical Exam:  /72   Pulse 75   Ht 5' 5" (1.651 m)   Wt 97.5 kg (215 lb)   SpO2 (!) 94%   BMI 35.78 kg/m²     Constitutional:  Non-toxic, no acute distress.  Performance status:  ECO  Eyes:  Sclerae anicteric, gaze symmetrical  Neck:  Trachea midline,  FROM  Resp:  Easy work of breathing  Abd:  Soft, non-tender, no masses  Musculoskeletal:  Ambulatory, normal gait, no muscle wasting.  Extremities are symmetrical without lymphedema.  Neuro:  No gross deficits  Psych:  Awake, alert, oriented.  Answers and asks questions appropriately      ICD-10-CM ICD-9-CM    1. Cystadenoma of pancreas  D13.6 211.6       Plan   This 73 y/o female had incidental finding of pancreas cyst in . She has no GI or digestive problems.  The serous cystadenoma has increased in size over the years but remains <5cm. The PD is quite dilated (this is not new). Dr. Baldwin did not recommend resection given size <5cm and asymptomatic. Will repeat surveillance MRI/ MRCP in 2 years.     Follow up in about 2 years (around 2024).      Questions were asked and answered to patient and her 's satisfaction.  We " discussed the need for continued clinical/radiographic/endoscopic follow-up.    Discussed case with Dr. Johann Baldwin who agrees with the above plan of care.         Lashon Godwin NP  Upper GI / Hepatobiliary Surgical Oncology  [facility] [place], [place]  Office: [phone]  Fax: [phone]

## 2023-06-05 ENCOUNTER — TELEPHONE (OUTPATIENT)
Dept: SURGERY | Facility: CLINIC | Age: 73
End: 2023-06-05
Payer: MEDICARE

## 2023-06-05 NOTE — TELEPHONE ENCOUNTER
Returned pt call and pt states her GI Dr Zamudio is going to put her on creon. Informed pt I will relay the message to Lashon SHAFFER.

## 2023-06-05 NOTE — TELEPHONE ENCOUNTER
----- Message from Barb Thibodeaux RN sent at 6/5/2023 10:03 AM CDT -----  Regarding: FW: pt advice  Contact: [phone] pt    ----- Message -----  From: Shanelle Dia  Sent: 6/5/2023  10:01 AM CDT  To: [facility] Staff  Subject: pt advice                                        Pt is calling to discuss plan of care test result finds showed pt pancrease is not producing enough enzymes. [facility] by Dr. Zamudio. Please call

## 2024-08-07 ENCOUNTER — TELEPHONE (OUTPATIENT)
Dept: SURGERY | Facility: CLINIC | Age: 74
End: 2024-08-07
Payer: MEDICARE

## 2024-08-07 DIAGNOSIS — D13.6 CYSTADENOMA OF PANCREAS: Primary | ICD-10-CM

## 2024-09-18 NOTE — PROGRESS NOTES
Encounter Date:  2024    Patient ID: Starr Lau  Age:  74 y.o. :  1950    Chief Complaint:  followup of pancreatic cyst    2010: EUS = 2.7 cm multicystic, honeycombed appearance. PD was 6.5 mm without defect in HOP, 2.2 in body and 1.4mm in tail.  Mass well defined, no LAD, no vessel involvement noted. Endosonographic appearance suspicious for SCA.   10/2013 (Dr. Johnson) Cyst looks stable at ~ 3.2 x 3.0 cm but the PD has increased from 6mm to 10 mm dilatation in the HOP  2018: ([facility]EL) Repeat EUS again c/w serous cystadenoma 3 cm. This had shown very little growth over a long (8 year) time frame.  2020: (SHANTEL) serous cystadenoma measures 3.7 cm, and the PD is quite dilated. Based on the lack of symptoms, and size < 5 cm, I do NOT recommend resection.  2022: MRI/ MRCP: stable panc cyst 3.9 x 3.8cm, atropy in body and tail of panc    Interval History:  Ms. Lau returns to clinic from [place], accompanied by her daughter. She was last seen in clinic in .   Recent imaging revealed slight increase in size in head of pancreas cyst. C/o abd cramping after breakfast. Reports BM daily, formed in the morning then turns to loose watery stool by afternoon, usually have 3-4 stools QD. Taking PERT. Weight is stable. She was seen by local GI, Dr. Watts, in 2023 as f/u after EGD in 2023. Remains afebrile. Denies CP, SOB.     No prior abd sx  Takes ASA   Never smoked    New Data:  Imaging:  I personally reviewed the following images:   2024: MRI/ MCRP abd:  PANCREAS: There is a 4.6 x 4.4 x 4.1 cm multiloculated microcystic lesion in the pancreatic head (04:28). It previously measured 4.3 cm in greatest dimension by my measurements. There is thin peripheral and septal enhancement. No nodular enhancing components. The main pancreatic duct is dilated up to 1.1 cm, also unchanged. There are multiple dilated side branches as well. Diffuse parenchymal atrophy.     Impression:   Slightly  enlarged 4.6 cm lesion in the pancreatic head, likely a serous cystadenoma.   Unchanged pancreatic ductal dilatation.   Mild mass effect on the distal common duct.  Unchanged prominence of the proximal common duct.    Endoscopy:  EUS 2018      Past Medical History:   Diagnosis Date    Asthma     Hyperlipidemia     Hypertension     Pancreatic cyst     Pancreatic duct dilated      Past Surgical History:   Procedure Laterality Date    ENDOSCOPIC ULTRASOUND OF UPPER GASTROINTESTINAL TRACT N/A 7/26/2018    Procedure: ULTRASOUND, ENDOSCOPIC, UPPER GI TRACT;  Surgeon: Quinn Santiago MD;  Location: [facility] ([address] [place] [address]);  Service: Endoscopy;  Laterality: N/A;    EYE SURGERY      HYSTERECTOMY      KNEE ARTHROSCOPY  2001     Current Outpatient Medications on File Prior to Visit   Medication Sig Dispense Refill    aspirin (ECOTRIN) 81 MG EC tablet Take 81 mg by mouth once daily.      atorvastatin (LIPITOR) 40 MG tablet atorvastatin 40 mg tablet      calcium carbonate (OS-ABDIEL) 600 mg (1,500 mg) Tab Take 600 mg by mouth 2 (two) times daily with meals.      co-enzyme Q-10 50 mg capsule Take 100 mg by mouth once daily.      fish oil-omega-3 fatty acids 300-1,000 mg capsule Take by mouth once daily.      Lactobacillus rhamnosus GG (CULTURELLE) 10 billion cell capsule Take 1 capsule by mouth once daily.      lisinopril (PRINIVIL,ZESTRIL) 2.5 MG tablet Take 2.5 mg by mouth Daily.      meloxicam (MOBIC) 15 MG tablet Take 15 mg by mouth once daily.      metoprolol succinate (TOPROL-XL) 50 MG 24 hr tablet metoprolol succinate ER 50 mg tablet,extended release 24 hr      multivitamin capsule Take 1 capsule by mouth once daily.       No current facility-administered medications on file prior to visit.     Review of patient's allergies indicates:  No Known Allergies    Family History:  Her family history includes Cancer in her maternal aunt.     Social History:   reports that she has never smoked. She has never used smokeless tobacco. She  "reports current alcohol use of about 2.0 standard drinks of alcohol per week. She reports that she does not use drugs.     ROS:     Pertinent positive/negatives detailed in HPI, all other systems negative.     Physical Exam:  /79 (BP Location: Left arm, Patient Position: Sitting, BP Method: Medium (Automatic))   Pulse 73   Ht 5' 5" (1.651 m)   Wt 98.2 kg (216 lb 7.9 oz)   SpO2 95%   BMI 36.03 kg/m²     Constitutional:  Non-toxic, no acute distress.    Eyes:  Sclerae anicteric, gaze symmetrical  Neck:  Trachea midline,  FROM  Resp:  Easy work of breathing  Musculoskeletal:  Ambulatory, normal gait, no muscle wasting.    Neuro:  No gross deficits  Psych:  Awake, alert, oriented.  Answers and asks questions appropriately      ICD-10-CM ICD-9-CM    1. Pancreatic cyst  K86.2 577.2       Plan     This 75 y/o female had incidental finding of pancreas cyst in 2010. She has no GI or digestive problems.  The serous cystadenoma has increased in size over the years but remains <5cm. The PD is quite dilated (this is not new). Dr. Baldwin did not recommend resection given size <5cm and asymptomatic. Will repeat surveillance MRI/ MRCP in 2 years.   Reviewed EPI, s/s and proper administration and titration of PERT. eRx sent to pharmacy on file.     Follow up in about 2 years (around 9/19/2026).      Questions were asked and answered to patient's satisfaction.            Lashon Godwin NP  Upper GI / Hepatobiliary Surgical Oncology  [facility] [place], [place]  Office: [phone]  Fax: [phone]        "

## 2024-09-19 ENCOUNTER — HOSPITAL ENCOUNTER (OUTPATIENT)
Dept: RADIOLOGY | Facility: HOSPITAL | Age: 74
Discharge: HOME | End: 2024-09-19
Attending: NURSE PRACTITIONER
Payer: MEDICARE

## 2024-09-19 ENCOUNTER — OFFICE VISIT (OUTPATIENT)
Dept: SURGERY | Facility: CLINIC | Age: 74
End: 2024-09-19
Payer: MEDICARE

## 2024-09-19 VITALS
SYSTOLIC BLOOD PRESSURE: 127 MMHG | DIASTOLIC BLOOD PRESSURE: 79 MMHG | HEIGHT: 65 IN | OXYGEN SATURATION: 95 % | WEIGHT: 216.5 LBS | HEART RATE: 73 BPM | BODY MASS INDEX: 36.07 KG/M2

## 2024-09-19 DIAGNOSIS — D13.6 CYSTADENOMA OF PANCREAS: ICD-10-CM

## 2024-09-19 DIAGNOSIS — K86.2 PANCREATIC CYST: Primary | ICD-10-CM

## 2024-09-19 PROCEDURE — 25500020 PHARM REV CODE 255: Performed by: NURSE PRACTITIONER

## 2024-09-19 PROCEDURE — 99213 OFFICE O/P EST LOW 20 MIN: CPT | Mod: PBBFAC,25 | Performed by: NURSE PRACTITIONER

## 2024-09-19 PROCEDURE — 74183 MRI ABD W/O CNTR FLWD CNTR: CPT | Mod: TC

## 2024-09-19 PROCEDURE — 76376 3D RENDER W/INTRP POSTPROCES: CPT | Mod: TC

## 2024-09-19 PROCEDURE — 74183 MRI ABD W/O CNTR FLWD CNTR: CPT | Mod: 26,,, | Performed by: INTERNAL MEDICINE

## 2024-09-19 PROCEDURE — 76376 3D RENDER W/INTRP POSTPROCES: CPT | Mod: 26,,, | Performed by: INTERNAL MEDICINE

## 2024-09-19 PROCEDURE — 99213 OFFICE O/P EST LOW 20 MIN: CPT | Mod: S$PBB,,, | Performed by: NURSE PRACTITIONER

## 2024-09-19 PROCEDURE — A9585 GADOBUTROL INJECTION: HCPCS | Performed by: NURSE PRACTITIONER

## 2024-09-19 PROCEDURE — 99999 PR PBB SHADOW E&M-EST. PATIENT-LVL III: CPT | Mod: PBBFAC,,, | Performed by: NURSE PRACTITIONER

## 2024-09-19 RX ORDER — PANCRELIPASE 36000; 180000; 114000 [USP'U]/1; [USP'U]/1; [USP'U]/1
2 CAPSULE, DELAYED RELEASE PELLETS ORAL
Qty: 180 CAPSULE | Refills: 0 | Status: SHIPPED | OUTPATIENT
Start: 2024-09-19 | End: 2024-10-19

## 2024-09-19 RX ORDER — OMEPRAZOLE 40 MG/1
40 CAPSULE, DELAYED RELEASE ORAL
COMMUNITY
Start: 2024-07-19

## 2024-09-19 RX ORDER — LOSARTAN POTASSIUM AND HYDROCHLOROTHIAZIDE 12.5; 1 MG/1; MG/1
1 TABLET ORAL
COMMUNITY
Start: 2024-08-27

## 2024-09-19 RX ORDER — GADOBUTROL 604.72 MG/ML
10 INJECTION INTRAVENOUS
Status: COMPLETED | OUTPATIENT
Start: 2024-09-19 | End: 2024-09-19

## 2024-09-19 RX ADMIN — GADOBUTROL 10 ML: 604.72 INJECTION INTRAVENOUS at 09:09

## 2024-09-27 ENCOUNTER — PATIENT MESSAGE (OUTPATIENT)
Dept: SURGERY | Facility: CLINIC | Age: 74
End: 2024-09-27
Payer: MEDICARE